# Patient Record
Sex: MALE | Race: WHITE
[De-identification: names, ages, dates, MRNs, and addresses within clinical notes are randomized per-mention and may not be internally consistent; named-entity substitution may affect disease eponyms.]

---

## 2019-10-21 ENCOUNTER — HOSPITAL ENCOUNTER (INPATIENT)
Dept: HOSPITAL 15 - ER | Age: 79
Discharge: HOME | DRG: 682 | End: 2019-10-21
Attending: INTERNAL MEDICINE | Admitting: INTERNAL MEDICINE
Payer: COMMERCIAL

## 2019-10-21 VITALS — DIASTOLIC BLOOD PRESSURE: 95 MMHG | SYSTOLIC BLOOD PRESSURE: 158 MMHG

## 2019-10-21 VITALS — HEIGHT: 73 IN | WEIGHT: 220 LBS | BODY MASS INDEX: 29.16 KG/M2

## 2019-10-21 VITALS — SYSTOLIC BLOOD PRESSURE: 158 MMHG | DIASTOLIC BLOOD PRESSURE: 95 MMHG

## 2019-10-21 DIAGNOSIS — I21.A1: ICD-10-CM

## 2019-10-21 DIAGNOSIS — D63.1: ICD-10-CM

## 2019-10-21 DIAGNOSIS — J43.9: ICD-10-CM

## 2019-10-21 DIAGNOSIS — H55.00: ICD-10-CM

## 2019-10-21 DIAGNOSIS — Z79.899: ICD-10-CM

## 2019-10-21 DIAGNOSIS — R91.1: ICD-10-CM

## 2019-10-21 DIAGNOSIS — F17.210: ICD-10-CM

## 2019-10-21 DIAGNOSIS — N20.0: ICD-10-CM

## 2019-10-21 DIAGNOSIS — N18.3: Primary | ICD-10-CM

## 2019-10-21 LAB
ALBUMIN SERPL-MCNC: 2.9 G/DL (ref 3.4–5)
ALP SERPL-CCNC: 68 U/L (ref 45–117)
ALT SERPL-CCNC: 15 U/L (ref 16–61)
ANION GAP SERPL CALCULATED.3IONS-SCNC: 7 MMOL/L (ref 5–15)
BILIRUB SERPL-MCNC: 0.5 MG/DL (ref 0.2–1)
BUN SERPL-MCNC: 25 MG/DL (ref 7–18)
BUN/CREAT SERPL: 17.2
CALCIUM SERPL-MCNC: 8.5 MG/DL (ref 8.5–10.1)
CHLORIDE SERPL-SCNC: 106 MMOL/L (ref 98–107)
CO2 SERPL-SCNC: 25 MMOL/L (ref 21–32)
GLUCOSE SERPL-MCNC: 137 MG/DL (ref 74–106)
HCT VFR BLD AUTO: 37.2 % (ref 41–53)
HGB BLD-MCNC: 12 G/DL (ref 13.5–17.5)
MAGNESIUM SERPL-MCNC: 2.2 MG/DL (ref 1.6–2.6)
MCH RBC QN AUTO: 28.5 PG (ref 28–32)
MCV RBC AUTO: 88.2 FL (ref 80–100)
NRBC BLD QL AUTO: 0 %
POTASSIUM SERPL-SCNC: 4.4 MMOL/L (ref 3.5–5.1)
PROT SERPL-MCNC: 7.2 G/DL (ref 6.4–8.2)
SODIUM SERPL-SCNC: 138 MMOL/L (ref 136–145)

## 2019-10-21 PROCEDURE — 36415 COLL VENOUS BLD VENIPUNCTURE: CPT

## 2019-10-21 PROCEDURE — 93005 ELECTROCARDIOGRAM TRACING: CPT

## 2019-10-21 PROCEDURE — 85025 COMPLETE CBC W/AUTO DIFF WBC: CPT

## 2019-10-21 PROCEDURE — 76775 US EXAM ABDO BACK WALL LIM: CPT

## 2019-10-21 PROCEDURE — 80053 COMPREHEN METABOLIC PANEL: CPT

## 2019-10-21 PROCEDURE — 70450 CT HEAD/BRAIN W/O DYE: CPT

## 2019-10-21 PROCEDURE — 84484 ASSAY OF TROPONIN QUANT: CPT

## 2019-10-21 PROCEDURE — 93306 TTE W/DOPPLER COMPLETE: CPT

## 2019-10-21 PROCEDURE — 96360 HYDRATION IV INFUSION INIT: CPT

## 2019-10-21 PROCEDURE — 83735 ASSAY OF MAGNESIUM: CPT

## 2019-10-21 PROCEDURE — 94761 N-INVAS EAR/PLS OXIMETRY MLT: CPT

## 2019-10-21 NOTE — NUR
Discharge instructions given as ordered. Encourage to follow up with PMD as instructed. All 
questions and concerns addressed. Patient verbalized understanding.  Medication 
reconciliation form completed and copy given to patient. Home medications held in Pharmacy 
returned to patient, and needed vaccines given. IV removed with catheter intact, pressure 
dressing applied. Telemetry unit returned to ICU by day shift RN. Patient taken to vehicle 
via wheelchair with all personal belongings, accompanied by staff and family member. No 
distress noted at time of departure.

## 2019-10-21 NOTE — NUR
CAME ON WC FROM ER, ALERT AND ORIENTED X4, NOT IN DISTRESS, CLEAR LS IN BILATERAL LUNG 
LOBES, SR R=96 ON TELE MONITOR, DENIED CH PAIN DIZZINESS OR DISCOMFORT, ABDOMEN SOFT WITH 
ACTIVE BS, LAST BM=10/20/19 AS REPORTED, SKIN INTACT WARM TO TOUCH, RESTING ON BED, VS 
T=97.7 RR=18 SAT=97% P=77 YJ=217/84, HEAD OF BED ELEVATED, BED ON LOWER POSITION, RAILS UP 
X2, CALL LIGHT ON REACH, PENDING CARDIAC CONSULT AND ECHO RESULT, WILL CONTINUE MONITORING.

## 2020-01-15 ENCOUNTER — HOSPITAL ENCOUNTER (EMERGENCY)
Dept: HOSPITAL 15 - ER | Age: 80
Discharge: HOME | End: 2020-01-15
Payer: COMMERCIAL

## 2020-01-15 VITALS — BODY MASS INDEX: 29.16 KG/M2 | WEIGHT: 220 LBS | HEIGHT: 73 IN

## 2020-01-15 VITALS — SYSTOLIC BLOOD PRESSURE: 141 MMHG | DIASTOLIC BLOOD PRESSURE: 66 MMHG

## 2020-01-15 DIAGNOSIS — N39.0: Primary | ICD-10-CM

## 2020-01-15 DIAGNOSIS — N18.3: ICD-10-CM

## 2020-01-15 DIAGNOSIS — I12.9: ICD-10-CM

## 2020-01-15 LAB
ANION GAP SERPL CALCULATED.3IONS-SCNC: 5 MMOL/L (ref 5–15)
BUN SERPL-MCNC: 28 MG/DL (ref 7–18)
BUN/CREAT SERPL: 15.7
CALCIUM SERPL-MCNC: 8.9 MG/DL (ref 8.5–10.1)
CHLORIDE SERPL-SCNC: 109 MMOL/L (ref 98–107)
CO2 SERPL-SCNC: 26 MMOL/L (ref 21–32)
GLUCOSE SERPL-MCNC: 97 MG/DL (ref 74–106)
HCT VFR BLD AUTO: 39.2 % (ref 41–53)
HGB BLD-MCNC: 13 G/DL (ref 13.5–17.5)
MAGNESIUM SERPL-MCNC: 2.3 MG/DL (ref 1.6–2.6)
MCH RBC QN AUTO: 27.6 PG (ref 28–32)
MCV RBC AUTO: 83.5 FL (ref 80–100)
NRBC BLD QL AUTO: 0.2 %
POTASSIUM SERPL-SCNC: 4.7 MMOL/L (ref 3.5–5.1)
SODIUM SERPL-SCNC: 140 MMOL/L (ref 136–145)
WBC CLUMPS UR QL AUTO: PRESENT /HPF

## 2020-01-15 PROCEDURE — 85025 COMPLETE CBC W/AUTO DIFF WBC: CPT

## 2020-01-15 PROCEDURE — 83735 ASSAY OF MAGNESIUM: CPT

## 2020-01-15 PROCEDURE — 81001 URINALYSIS AUTO W/SCOPE: CPT

## 2020-01-15 PROCEDURE — 36415 COLL VENOUS BLD VENIPUNCTURE: CPT

## 2020-01-15 PROCEDURE — 99284 EMERGENCY DEPT VISIT MOD MDM: CPT

## 2020-01-15 PROCEDURE — 80048 BASIC METABOLIC PNL TOTAL CA: CPT

## 2020-01-15 PROCEDURE — 51702 INSERT TEMP BLADDER CATH: CPT

## 2020-01-15 PROCEDURE — 96372 THER/PROPH/DIAG INJ SC/IM: CPT

## 2020-03-03 ENCOUNTER — HOSPITAL ENCOUNTER (EMERGENCY)
Dept: HOSPITAL 15 - ER | Age: 80
LOS: 1 days | Discharge: HOME | End: 2020-03-04
Payer: COMMERCIAL

## 2020-03-03 VITALS — HEIGHT: 73 IN | WEIGHT: 229 LBS | BODY MASS INDEX: 30.35 KG/M2

## 2020-03-03 DIAGNOSIS — F17.210: ICD-10-CM

## 2020-03-03 DIAGNOSIS — Z98.890: ICD-10-CM

## 2020-03-03 DIAGNOSIS — R33.8: Primary | ICD-10-CM

## 2020-03-03 PROCEDURE — 36415 COLL VENOUS BLD VENIPUNCTURE: CPT

## 2020-03-03 PROCEDURE — 51702 INSERT TEMP BLADDER CATH: CPT

## 2020-03-03 PROCEDURE — 80053 COMPREHEN METABOLIC PANEL: CPT

## 2020-03-03 PROCEDURE — 85025 COMPLETE CBC W/AUTO DIFF WBC: CPT

## 2020-03-04 VITALS — SYSTOLIC BLOOD PRESSURE: 120 MMHG | DIASTOLIC BLOOD PRESSURE: 79 MMHG

## 2020-03-04 LAB
ALBUMIN SERPL-MCNC: 3.1 G/DL (ref 3.4–5)
ALP SERPL-CCNC: 95 U/L (ref 45–117)
ALT SERPL-CCNC: 11 U/L (ref 16–61)
ANION GAP SERPL CALCULATED.3IONS-SCNC: 7 MMOL/L (ref 5–15)
BILIRUB SERPL-MCNC: 0.5 MG/DL (ref 0.2–1)
BUN SERPL-MCNC: 26 MG/DL (ref 7–18)
BUN/CREAT SERPL: 16.9
CALCIUM SERPL-MCNC: 8.6 MG/DL (ref 8.5–10.1)
CHLORIDE SERPL-SCNC: 106 MMOL/L (ref 98–107)
CO2 SERPL-SCNC: 24 MMOL/L (ref 21–32)
GLUCOSE SERPL-MCNC: 117 MG/DL (ref 74–106)
HCT VFR BLD AUTO: 35.4 % (ref 41–53)
HGB BLD-MCNC: 12.1 G/DL (ref 13.5–17.5)
MCH RBC QN AUTO: 27.9 PG (ref 28–32)
MCV RBC AUTO: 82.1 FL (ref 80–100)
NRBC BLD QL AUTO: 0 %
POTASSIUM SERPL-SCNC: 4.4 MMOL/L (ref 3.5–5.1)
PROT SERPL-MCNC: 7.7 G/DL (ref 6.4–8.2)
SODIUM SERPL-SCNC: 137 MMOL/L (ref 136–145)

## 2020-08-17 ENCOUNTER — HOSPITAL ENCOUNTER (INPATIENT)
Dept: HOSPITAL 15 - ER | Age: 80
LOS: 1 days | Discharge: LEFT BEFORE DISCHARGE | DRG: 682 | End: 2020-08-18
Attending: HOSPITALIST | Admitting: INTERNAL MEDICINE
Payer: COMMERCIAL

## 2020-08-17 VITALS — HEIGHT: 73 IN | WEIGHT: 210 LBS | BODY MASS INDEX: 27.83 KG/M2

## 2020-08-17 DIAGNOSIS — Z82.49: ICD-10-CM

## 2020-08-17 DIAGNOSIS — N17.9: ICD-10-CM

## 2020-08-17 DIAGNOSIS — Z83.3: ICD-10-CM

## 2020-08-17 DIAGNOSIS — Z80.9: ICD-10-CM

## 2020-08-17 DIAGNOSIS — I49.3: ICD-10-CM

## 2020-08-17 DIAGNOSIS — G89.29: ICD-10-CM

## 2020-08-17 DIAGNOSIS — E87.1: ICD-10-CM

## 2020-08-17 DIAGNOSIS — Z88.0: ICD-10-CM

## 2020-08-17 DIAGNOSIS — J44.9: ICD-10-CM

## 2020-08-17 DIAGNOSIS — K80.20: ICD-10-CM

## 2020-08-17 DIAGNOSIS — N20.9: ICD-10-CM

## 2020-08-17 DIAGNOSIS — E43: ICD-10-CM

## 2020-08-17 DIAGNOSIS — D63.8: ICD-10-CM

## 2020-08-17 DIAGNOSIS — Z53.29: ICD-10-CM

## 2020-08-17 DIAGNOSIS — N13.6: ICD-10-CM

## 2020-08-17 DIAGNOSIS — N40.0: ICD-10-CM

## 2020-08-17 DIAGNOSIS — Z87.891: ICD-10-CM

## 2020-08-17 DIAGNOSIS — E78.5: ICD-10-CM

## 2020-08-17 DIAGNOSIS — Z85.51: ICD-10-CM

## 2020-08-17 DIAGNOSIS — N28.89: ICD-10-CM

## 2020-08-17 DIAGNOSIS — N18.3: Primary | ICD-10-CM

## 2020-08-17 DIAGNOSIS — N28.1: ICD-10-CM

## 2020-08-17 DIAGNOSIS — Z87.442: ICD-10-CM

## 2020-08-17 LAB
ALBUMIN SERPL-MCNC: 2.5 G/DL (ref 3.4–5)
ALCOHOL, URINE: < 3 MG/DL (ref 0–10)
ALP SERPL-CCNC: 101 U/L (ref 45–117)
ALT SERPL-CCNC: 17 U/L (ref 16–61)
AMPHETAMINES UR QL SCN: NEGATIVE
AMYLASE SERPL-CCNC: 59 U/L (ref 25–115)
ANION GAP SERPL CALCULATED.3IONS-SCNC: 5 MMOL/L (ref 5–15)
BARBITURATES UR QL SCN: NEGATIVE
BENZODIAZ UR QL SCN: NEGATIVE
BILIRUB SERPL-MCNC: 0.5 MG/DL (ref 0.2–1)
BUN SERPL-MCNC: 21 MG/DL (ref 7–18)
BUN/CREAT SERPL: 13.6
BZE UR QL SCN: NEGATIVE
CALCIUM SERPL-MCNC: 8.7 MG/DL (ref 8.5–10.1)
CANNABINOIDS UR QL SCN: NEGATIVE
CHLORIDE SERPL-SCNC: 101 MMOL/L (ref 98–107)
CHOLEST SERPL-MCNC: 152 MG/DL (ref ?–200)
CO2 SERPL-SCNC: 29 MMOL/L (ref 21–32)
GLUCOSE SERPL-MCNC: 104 MG/DL (ref 74–106)
HCT VFR BLD AUTO: 21.2 % (ref 41–53)
HDLC SERPL-MCNC: 28 MG/DL (ref 40–59)
HGB BLD-MCNC: 6.8 G/DL (ref 13.5–17.5)
IRON SERPL-MCNC: 14 UG/DL (ref 65–175)
LIPASE SERPL-CCNC: 69 U/L (ref 73–393)
MCH RBC QN AUTO: 23 PG (ref 28–32)
MCV RBC AUTO: 71.7 FL (ref 80–100)
NRBC BLD QL AUTO: 0 %
OPIATES UR QL SCN: NEGATIVE
PCP UR QL SCN: NEGATIVE
POTASSIUM SERPL-SCNC: 3.9 MMOL/L (ref 3.5–5.1)
PROT SERPL-MCNC: 7.6 G/DL (ref 6.4–8.2)
SODIUM SERPL-SCNC: 135 MMOL/L (ref 136–145)
TIBC SERPL-MCNC: 158 UG/DL (ref 250–450)
TRIGL SERPL-MCNC: 86 MG/DL (ref ?–150)

## 2020-08-17 PROCEDURE — 83550 IRON BINDING TEST: CPT

## 2020-08-17 PROCEDURE — 76775 US EXAM ABDO BACK WALL LIM: CPT

## 2020-08-17 PROCEDURE — 36415 COLL VENOUS BLD VENIPUNCTURE: CPT

## 2020-08-17 PROCEDURE — 86920 COMPATIBILITY TEST SPIN: CPT

## 2020-08-17 PROCEDURE — 80053 COMPREHEN METABOLIC PANEL: CPT

## 2020-08-17 PROCEDURE — 85025 COMPLETE CBC W/AUTO DIFF WBC: CPT

## 2020-08-17 PROCEDURE — 84100 ASSAY OF PHOSPHORUS: CPT

## 2020-08-17 PROCEDURE — 93005 ELECTROCARDIOGRAM TRACING: CPT

## 2020-08-17 PROCEDURE — 80061 LIPID PANEL: CPT

## 2020-08-17 PROCEDURE — 85610 PROTHROMBIN TIME: CPT

## 2020-08-17 PROCEDURE — 93306 TTE W/DOPPLER COMPLETE: CPT

## 2020-08-17 PROCEDURE — 78452 HT MUSCLE IMAGE SPECT MULT: CPT

## 2020-08-17 PROCEDURE — 83540 ASSAY OF IRON: CPT

## 2020-08-17 PROCEDURE — 84484 ASSAY OF TROPONIN QUANT: CPT

## 2020-08-17 PROCEDURE — 83036 HEMOGLOBIN GLYCOSYLATED A1C: CPT

## 2020-08-17 PROCEDURE — 87040 BLOOD CULTURE FOR BACTERIA: CPT

## 2020-08-17 PROCEDURE — 93017 CV STRESS TEST TRACING ONLY: CPT

## 2020-08-17 PROCEDURE — 80307 DRUG TEST PRSMV CHEM ANLYZR: CPT

## 2020-08-17 PROCEDURE — 86900 BLOOD TYPING SEROLOGIC ABO: CPT

## 2020-08-17 PROCEDURE — 86901 BLOOD TYPING SEROLOGIC RH(D): CPT

## 2020-08-17 PROCEDURE — 86850 RBC ANTIBODY SCREEN: CPT

## 2020-08-17 PROCEDURE — 81001 URINALYSIS AUTO W/SCOPE: CPT

## 2020-08-17 PROCEDURE — 83690 ASSAY OF LIPASE: CPT

## 2020-08-17 PROCEDURE — 85730 THROMBOPLASTIN TIME PARTIAL: CPT

## 2020-08-17 PROCEDURE — 74176 CT ABD & PELVIS W/O CONTRAST: CPT

## 2020-08-17 PROCEDURE — 83735 ASSAY OF MAGNESIUM: CPT

## 2020-08-17 PROCEDURE — 82150 ASSAY OF AMYLASE: CPT

## 2020-08-17 PROCEDURE — 87086 URINE CULTURE/COLONY COUNT: CPT

## 2020-08-17 RX ADMIN — SODIUM CHLORIDE SCH MLS/HR: 0.9 INJECTION, SOLUTION INTRAVENOUS at 17:06

## 2020-08-17 RX ADMIN — AZTREONAM SCH MLS/HR: 1 INJECTION, POWDER, FOR SOLUTION INTRAMUSCULAR; INTRAVENOUS at 23:10

## 2020-08-18 VITALS — SYSTOLIC BLOOD PRESSURE: 136 MMHG | DIASTOLIC BLOOD PRESSURE: 76 MMHG

## 2020-08-18 VITALS — DIASTOLIC BLOOD PRESSURE: 78 MMHG | SYSTOLIC BLOOD PRESSURE: 148 MMHG

## 2020-08-18 VITALS — SYSTOLIC BLOOD PRESSURE: 141 MMHG | DIASTOLIC BLOOD PRESSURE: 72 MMHG

## 2020-08-18 VITALS — DIASTOLIC BLOOD PRESSURE: 65 MMHG | SYSTOLIC BLOOD PRESSURE: 113 MMHG

## 2020-08-18 VITALS — DIASTOLIC BLOOD PRESSURE: 67 MMHG | SYSTOLIC BLOOD PRESSURE: 128 MMHG

## 2020-08-18 VITALS — DIASTOLIC BLOOD PRESSURE: 68 MMHG | SYSTOLIC BLOOD PRESSURE: 112 MMHG

## 2020-08-18 VITALS — SYSTOLIC BLOOD PRESSURE: 150 MMHG | DIASTOLIC BLOOD PRESSURE: 76 MMHG

## 2020-08-18 VITALS — DIASTOLIC BLOOD PRESSURE: 76 MMHG | SYSTOLIC BLOOD PRESSURE: 150 MMHG

## 2020-08-18 VITALS — DIASTOLIC BLOOD PRESSURE: 63 MMHG | SYSTOLIC BLOOD PRESSURE: 146 MMHG

## 2020-08-18 VITALS — SYSTOLIC BLOOD PRESSURE: 133 MMHG | DIASTOLIC BLOOD PRESSURE: 76 MMHG

## 2020-08-18 VITALS — SYSTOLIC BLOOD PRESSURE: 108 MMHG | DIASTOLIC BLOOD PRESSURE: 68 MMHG

## 2020-08-18 VITALS — SYSTOLIC BLOOD PRESSURE: 136 MMHG | DIASTOLIC BLOOD PRESSURE: 66 MMHG

## 2020-08-18 VITALS — DIASTOLIC BLOOD PRESSURE: 89 MMHG | SYSTOLIC BLOOD PRESSURE: 139 MMHG

## 2020-08-18 VITALS — DIASTOLIC BLOOD PRESSURE: 65 MMHG | SYSTOLIC BLOOD PRESSURE: 124 MMHG

## 2020-08-18 LAB
ALBUMIN SERPL-MCNC: 2.3 G/DL (ref 3.4–5)
ALP SERPL-CCNC: 82 U/L (ref 45–117)
ALT SERPL-CCNC: 14 U/L (ref 16–61)
ANION GAP SERPL CALCULATED.3IONS-SCNC: 5 MMOL/L (ref 5–15)
APTT PPP: 31.9 SEC (ref 23–31.2)
BILIRUB SERPL-MCNC: 0.8 MG/DL (ref 0.2–1)
BUN SERPL-MCNC: 21 MG/DL (ref 7–18)
BUN/CREAT SERPL: 14.3
CALCIUM SERPL-MCNC: 8.4 MG/DL (ref 8.5–10.1)
CHLORIDE SERPL-SCNC: 104 MMOL/L (ref 98–107)
CO2 SERPL-SCNC: 27 MMOL/L (ref 21–32)
GLUCOSE SERPL-MCNC: 95 MG/DL (ref 74–106)
HCT VFR BLD AUTO: 22.4 % (ref 41–53)
HGB BLD-MCNC: 7.2 G/DL (ref 13.5–17.5)
INR PPP: 1.23 (ref 0.9–1.15)
MAGNESIUM SERPL-MCNC: 2.3 MG/DL (ref 1.6–2.6)
MCH RBC QN AUTO: 23.7 PG (ref 28–32)
MCV RBC AUTO: 74 FL (ref 80–100)
NRBC BLD QL AUTO: 0 %
POTASSIUM SERPL-SCNC: 4 MMOL/L (ref 3.5–5.1)
PROT SERPL-MCNC: 6.9 G/DL (ref 6.4–8.2)
SODIUM SERPL-SCNC: 136 MMOL/L (ref 136–145)

## 2020-08-18 PROCEDURE — 30233N1 TRANSFUSION OF NONAUTOLOGOUS RED BLOOD CELLS INTO PERIPHERAL VEIN, PERCUTANEOUS APPROACH: ICD-10-PCS | Performed by: HOSPITALIST

## 2020-08-18 RX ADMIN — AZTREONAM SCH MLS/HR: 1 INJECTION, POWDER, FOR SOLUTION INTRAMUSCULAR; INTRAVENOUS at 05:47

## 2020-08-18 RX ADMIN — AZTREONAM SCH MLS/HR: 1 INJECTION, POWDER, FOR SOLUTION INTRAMUSCULAR; INTRAVENOUS at 14:00

## 2020-08-18 RX ADMIN — SODIUM CHLORIDE SCH MLS/HR: 0.9 INJECTION, SOLUTION INTRAVENOUS at 09:55

## 2020-08-18 NOTE — NUR
BLOOD TRANSFUSION ENDED

BLOOD TRANSFUSION COMPLETE AND DONE INFUSING. PT TOLERATED WELL. DO S/S OF SOB/DISTRESS OR 
PAIN. PT DENIES ANY TRANSFUSION RELATED S/S. VSS. WILL CONTINUE TO MONITOR

## 2020-08-18 NOTE — NUR
Closing Note

Patient awake and alert.  Patient requesting to go AMA as he is not wanting to wait for u/s 
results.  No S/S of distress/SOB.  AMA endorsed. Care endorsed to Krzysztof ALBERTS RN.

-------------------------------------------------------------------------------

Addendum: 08/18/20 at 1959 by REJI CHRISTIANSEN RN

-------------------------------------------------------------------------------

pt AMBERNA now

## 2020-08-18 NOTE — NUR
AMA Note



FITZ NGUYEN states they want to leave the hospital Against Medical Advice (AMA).  
Patient encouraged to stay for further treatment/stabilization.  MUSA CANTU MD notified 
of patient's wishes.  Tele monitor removed and returned to telemetry techs. Patient advised 
of the risks and benefits of leaving AMA.  Patient verbalized understanding.  Patient 
encouraged to return to the ER if symptoms do not improve or worsen. pt walked out with 
walker with slow steady gait. no S/S of distress/sob or pain at time of AMA.

## 2020-08-18 NOTE — NUR
Telemetry admit from PACU

FITZ NGUYEN admitted to Telemetry unit after SBAR received.  Patient oriented to 
REJI CHRISTIANSEN, primary RN, unit, room, bed, and unit policies regarding patient care and 
visiting hours. Patient now on continuous telemetry monitoring, tele box # 47

 and telemetry reading on arrival to unit is [hr 80]. Patient is alert and awake with even 
and unlabored respirations. no S/S of distress/sob or distress.   bed is in lowest locked 
position, side rails up x2 and call light is within reach. Last unit of PRBC's currently 
running. unit started at 1618 by PACU RN. Patient tolerating well, no S/S of transfusion 
reaction present at this time. Pt educate on S/S and informed to call immediately if these 
S/S become present. Pt verbalized understanding. All questions and concerns addressed. Will 
continue to monitor q1h and PRN.

## 2020-08-18 NOTE — NUR
PT WANTING TO LEAVE

PT EXPRESSING THAT HE DOESN'T WANT TO WAIT HERE ANYMORE AND HE IS READY TO GO HOME. 
ATTEMPTED TO CALL DR. CLEMENS. SPOKE WITH DR. RAGSDALE, WHO IS COVERING FOR DR. CLEMENS. UPDATE  
ON PT STATUS AND PT NOT WANTING TO WAIT FOR TEST RESULTS TO COME BACK. PER DR. CHAWLA TO LET 
PT LEAVE AMA AND HE WILL INFORM DR. CLEMENS KNOW. WILL INFORM NOC ANGIE.

## 2020-08-18 NOTE — NUR
PATIENT ARRIVED TO PACU VIA WHEELCHAIR, AMBULATED TO [facility] WITHOUT INCIDENT. BEDSIDE REPORT 
RECEIVED FROM ER EXTERN. PATIENT IS ALERT AND APPROPRIATE. PATIENT ORIENTATED TO 
SURROUNDINGS, TELEMETRY BEING MONITORED AT THE BEDSIDE. WILL CONTINUE TO MONITOR.

## 2020-08-18 NOTE — NUR
DR. CLEMENS AT THE BEDSIDE IN PACU, NEW ORDERS RECEIVED. TRANSFUSION TO BE COMPLETED AFTER 
STRESS TEST.

## 2020-08-18 NOTE — NUR
SPOKE WITH DR. CLEMENS

SPOKE WITH DR. CLEMENS OVER THE PHONE. OER DR. MEDRANO NEEDS TO HAVE KIDNEY U/ DONE BEFORE D/C AND 
TO CALL HIM WITH RESULTS PRIOR TO D/C. D/C MAY BE HELD DEPENDING ON RESULTS OF U/S. WILL 
CONTINUE TO MONITOR Q1H AND PRN.

## 2020-08-18 NOTE — NUR
PATIENT TRANSPORTED TO ROOM 220A AND AMBULATED TO THE BED FROM THE [facility] WITHOUT INCIDENT. 
BAG AND SHOES WITH PATIENT REJI RN TO THE BEDSIDE. REPORT UPDATED. BED LOW, CALL BELL 
WITHIN REACH.

## 2020-08-18 NOTE — NUR
DR. CLEMENS UPDATED WITH BLOOD TRANSFUSION STATUS, S/P ECHO,  HAS NOT SEEN 
PATIENT AT THIS TIME AND DR. CLEMENS IS AWARE AND WILL COMPLETE THIS AS SOON AS POSSIBLE, 
PATIENT TO BE DISCHARGED AS ORDERED.  PATIENT IS AWARE AND WANTS TO GO HOME TONIGHT AS WELL.

## 2020-08-18 NOTE — NUR
TWO #2 IV'S PLACED AT THIS TIME, ONE LEFT HAND AND ONE IN THE RIGHT HAND IN PREPARATION FOR 
STRESS TEST.

## 2020-08-19 NOTE — NUR
ss consult

Per ss consult home health and front wheel walker. Patient left AMA prior to being assessed. 
Patient informed me via telephone that he has a fww and he does not need home health. 
Patient refused ss consult. 

-------------------------------------------------------------------------------

Addendum: 08/20/20 at 1123 by Elyssa Alberts 

-------------------------------------------------------------------------------

Amended: Links added.

## 2020-09-08 ENCOUNTER — HOSPITAL ENCOUNTER (INPATIENT)
Dept: HOSPITAL 15 - ER | Age: 80
LOS: 1 days | Discharge: HOME HEALTH | DRG: 725 | End: 2020-09-09
Attending: INTERNAL MEDICINE | Admitting: INTERNAL MEDICINE
Payer: COMMERCIAL

## 2020-09-08 VITALS — BODY MASS INDEX: 28.78 KG/M2 | WEIGHT: 217.16 LBS | HEIGHT: 73 IN

## 2020-09-08 VITALS — DIASTOLIC BLOOD PRESSURE: 69 MMHG | SYSTOLIC BLOOD PRESSURE: 124 MMHG

## 2020-09-08 VITALS — DIASTOLIC BLOOD PRESSURE: 70 MMHG | SYSTOLIC BLOOD PRESSURE: 135 MMHG

## 2020-09-08 DIAGNOSIS — N17.0: ICD-10-CM

## 2020-09-08 DIAGNOSIS — N28.89: ICD-10-CM

## 2020-09-08 DIAGNOSIS — J44.9: ICD-10-CM

## 2020-09-08 DIAGNOSIS — Z82.49: ICD-10-CM

## 2020-09-08 DIAGNOSIS — Z87.891: ICD-10-CM

## 2020-09-08 DIAGNOSIS — Z90.89: ICD-10-CM

## 2020-09-08 DIAGNOSIS — K57.30: ICD-10-CM

## 2020-09-08 DIAGNOSIS — R33.8: ICD-10-CM

## 2020-09-08 DIAGNOSIS — Z80.9: ICD-10-CM

## 2020-09-08 DIAGNOSIS — N40.1: Primary | ICD-10-CM

## 2020-09-08 DIAGNOSIS — Z88.0: ICD-10-CM

## 2020-09-08 DIAGNOSIS — Z83.3: ICD-10-CM

## 2020-09-08 DIAGNOSIS — E87.1: ICD-10-CM

## 2020-09-08 DIAGNOSIS — Z85.51: ICD-10-CM

## 2020-09-08 DIAGNOSIS — D64.9: ICD-10-CM

## 2020-09-08 DIAGNOSIS — N18.9: ICD-10-CM

## 2020-09-08 DIAGNOSIS — N13.30: ICD-10-CM

## 2020-09-08 DIAGNOSIS — K80.20: ICD-10-CM

## 2020-09-08 LAB
ALBUMIN SERPL-MCNC: 2.7 G/DL (ref 3.4–5)
ALP SERPL-CCNC: 70 U/L (ref 45–117)
ALT SERPL-CCNC: 13 U/L (ref 16–61)
AMYLASE SERPL-CCNC: 53 U/L (ref 25–115)
ANION GAP SERPL CALCULATED.3IONS-SCNC: 4 MMOL/L (ref 5–15)
BILIRUB SERPL-MCNC: 0.5 MG/DL (ref 0.2–1)
BUN SERPL-MCNC: 21 MG/DL (ref 7–18)
BUN/CREAT SERPL: 13.3
CALCIUM SERPL-MCNC: 8.7 MG/DL (ref 8.5–10.1)
CHLORIDE SERPL-SCNC: 101 MMOL/L (ref 98–107)
CO2 SERPL-SCNC: 27 MMOL/L (ref 21–32)
GLUCOSE SERPL-MCNC: 110 MG/DL (ref 74–106)
HCT VFR BLD AUTO: 25 % (ref 41–53)
HGB BLD-MCNC: 7.8 G/DL (ref 13.5–17.5)
LIPASE SERPL-CCNC: 63 U/L (ref 73–393)
MAGNESIUM SERPL-MCNC: 2.5 MG/DL (ref 1.6–2.6)
MCH RBC QN AUTO: 23.5 PG (ref 28–32)
MCV RBC AUTO: 74.9 FL (ref 80–100)
NRBC BLD QL AUTO: 0 %
POTASSIUM SERPL-SCNC: 4.5 MMOL/L (ref 3.5–5.1)
PROT SERPL-MCNC: 7.6 G/DL (ref 6.4–8.2)
SODIUM SERPL-SCNC: 132 MMOL/L (ref 136–145)

## 2020-09-08 PROCEDURE — 83690 ASSAY OF LIPASE: CPT

## 2020-09-08 PROCEDURE — 74176 CT ABD & PELVIS W/O CONTRAST: CPT

## 2020-09-08 PROCEDURE — 85730 THROMBOPLASTIN TIME PARTIAL: CPT

## 2020-09-08 PROCEDURE — 83735 ASSAY OF MAGNESIUM: CPT

## 2020-09-08 PROCEDURE — 85025 COMPLETE CBC W/AUTO DIFF WBC: CPT

## 2020-09-08 PROCEDURE — 87081 CULTURE SCREEN ONLY: CPT

## 2020-09-08 PROCEDURE — 82150 ASSAY OF AMYLASE: CPT

## 2020-09-08 PROCEDURE — 80053 COMPREHEN METABOLIC PANEL: CPT

## 2020-09-08 PROCEDURE — 94640 AIRWAY INHALATION TREATMENT: CPT

## 2020-09-08 PROCEDURE — 93005 ELECTROCARDIOGRAM TRACING: CPT

## 2020-09-08 PROCEDURE — 85610 PROTHROMBIN TIME: CPT

## 2020-09-08 PROCEDURE — 71045 X-RAY EXAM CHEST 1 VIEW: CPT

## 2020-09-08 PROCEDURE — 36415 COLL VENOUS BLD VENIPUNCTURE: CPT

## 2020-09-08 RX ADMIN — SODIUM CHLORIDE SCH MLS/HR: 4.5 INJECTION, SOLUTION INTRAVENOUS at 14:54

## 2020-09-08 NOTE — NUR
Opening Shift Note

Assumed care of patient, awake and alert.  No S/S of distress/SOB or pain.  Patient on 2L 
NC. Instructed on POC and to call for assist PRN, will continue to monitor for changes Q1hr 
and PRN.

## 2020-09-08 NOTE — NUR
Closing note



Patient resting in bed with even and unlabored respirations, no distress noted. Fall 
precautions in place with call light within reach.

## 2020-09-08 NOTE — NUR
Patient transferred self from wheelchair to bed



Patient ambulated with a steady gait. No distress noted.

## 2020-09-08 NOTE — NUR
Telemetry admit from ER



FITZ NGUYEN admitted to Telemetry unit after SBAR received.  Patient oriented to 
Courtney McBurney primary RN, unit, room, bed, and unit policies regarding patient care and 
visiting hours. Patient now on continuous telemetry monitoring, tele box #25  and telemetry 
reading on arrival to unit is  bpm. Patient placed on bedside oxygen 2 LPM NC and 
weighed by bed scale. Patient has Faulkner catheter in place. Catheter is patent draining into 
a leg bag. Patient demonstrated proper technique for draining catheter bag. patient 
requesting to continue emptying catheter bag.  Instructed patient on POC, fall precautions 
and to call for assistance as needed. patient verbalized understanding. Fall precautions in 
place with call light within reach.

## 2020-09-09 VITALS — DIASTOLIC BLOOD PRESSURE: 68 MMHG | SYSTOLIC BLOOD PRESSURE: 123 MMHG

## 2020-09-09 VITALS — SYSTOLIC BLOOD PRESSURE: 121 MMHG | DIASTOLIC BLOOD PRESSURE: 66 MMHG

## 2020-09-09 VITALS — DIASTOLIC BLOOD PRESSURE: 70 MMHG | SYSTOLIC BLOOD PRESSURE: 120 MMHG

## 2020-09-09 LAB
ALBUMIN SERPL-MCNC: 2.4 G/DL (ref 3.4–5)
ALP SERPL-CCNC: 61 U/L (ref 45–117)
ALT SERPL-CCNC: 10 U/L (ref 16–61)
ANION GAP SERPL CALCULATED.3IONS-SCNC: 4 MMOL/L (ref 5–15)
APTT PPP: 29.9 SEC (ref 23–31.2)
BILIRUB SERPL-MCNC: 0.4 MG/DL (ref 0.2–1)
BUN SERPL-MCNC: 27 MG/DL (ref 7–18)
BUN/CREAT SERPL: 18.4
CALCIUM SERPL-MCNC: 8.6 MG/DL (ref 8.5–10.1)
CHLORIDE SERPL-SCNC: 102 MMOL/L (ref 98–107)
CO2 SERPL-SCNC: 26 MMOL/L (ref 21–32)
GLUCOSE SERPL-MCNC: 113 MG/DL (ref 74–106)
HCT VFR BLD AUTO: 22.5 % (ref 41–53)
HGB BLD-MCNC: 7.2 G/DL (ref 13.5–17.5)
INR PPP: 1.23 (ref 0.9–1.15)
MAGNESIUM SERPL-MCNC: 2.9 MG/DL (ref 1.6–2.6)
MCH RBC QN AUTO: 23.7 PG (ref 28–32)
MCV RBC AUTO: 73.7 FL (ref 80–100)
NRBC BLD QL AUTO: 0 %
POTASSIUM SERPL-SCNC: 4.8 MMOL/L (ref 3.5–5.1)
PROT SERPL-MCNC: 7 G/DL (ref 6.4–8.2)
SODIUM SERPL-SCNC: 132 MMOL/L (ref 136–145)

## 2020-09-09 RX ADMIN — SODIUM CHLORIDE SCH MLS/HR: 4.5 INJECTION, SOLUTION INTRAVENOUS at 04:12

## 2020-09-09 NOTE — NUR
Patient was discharge prior to assess. 



Regarding social service consult for home health safety evaluation. Faxed clinical 
information to [facility] medical [facility]. Per Risa with [facility] patient has 
been accepted and service to start within 24-48hrs upon d/c day.

## 2020-09-09 NOTE — NUR
Opening Shift Note



Assumed care of patient, A/Ox4. Patient sitting in high-jay's position upon entering the 
room. Patient's IV dressing was saturated with blood and a new dressing was applied as well 
as hooked up and restarted at 70 ml/hr. Patient is able to ambulate to the restroom to empty 
leg bag independently and does so when needed. No S/S of distress/SOB or pain.  Instructed 
on POC for the day and to call for assist PRN, will continue to monitor for changes Q1hr and 
PRN.

## 2020-09-09 NOTE — NUR
Patient discharged off floor



Patient was given verbal and written education on diagnosis, prevention, diet and follow up 
appointment information. Patient verbalized understanding. IV was removed and a bandage was 
applied with coban. No signs of trauma or bleeding at the IV site. Patient was wheeled off 
the unit accompanied by staff and taken to the front of the hospital. Patient was received 
by his girlfriend. No signs of respiratory distress or pain was noted at discharge.

## 2020-09-09 NOTE — NUR
Dr. Crawford at bedside



Dr Crawford at bedside assessing patient's feelings of SOB and oxygen saturations. Patient 
instructed to walk the unit then reassess saturations. Patient ambulated around the nurses 
station returning to his bed with saturations measuring at 97%. Patient was instructed by 
the physician to maintain follow up appointments and was told he would be discharged today. 
Patient verbalized understanding. Will continue to monitor the patient and carry out 
discharge orders.

## 2020-10-30 ENCOUNTER — HOSPITAL ENCOUNTER (EMERGENCY)
Dept: HOSPITAL 15 - ER | Age: 80
Discharge: HOME | End: 2020-10-30
Payer: COMMERCIAL

## 2020-10-30 VITALS — SYSTOLIC BLOOD PRESSURE: 129 MMHG | DIASTOLIC BLOOD PRESSURE: 69 MMHG

## 2020-10-30 VITALS — HEIGHT: 73 IN | WEIGHT: 190 LBS | BODY MASS INDEX: 25.18 KG/M2

## 2020-10-30 DIAGNOSIS — N40.0: ICD-10-CM

## 2020-10-30 DIAGNOSIS — Z87.891: ICD-10-CM

## 2020-10-30 DIAGNOSIS — J44.9: ICD-10-CM

## 2020-10-30 DIAGNOSIS — Z88.8: ICD-10-CM

## 2020-10-30 DIAGNOSIS — I10: ICD-10-CM

## 2020-10-30 DIAGNOSIS — E78.5: ICD-10-CM

## 2020-10-30 DIAGNOSIS — Z90.89: ICD-10-CM

## 2020-10-30 DIAGNOSIS — Z98.890: ICD-10-CM

## 2020-10-30 DIAGNOSIS — Z88.0: ICD-10-CM

## 2020-10-30 DIAGNOSIS — Z46.6: ICD-10-CM

## 2020-10-30 DIAGNOSIS — Z79.899: ICD-10-CM

## 2020-10-30 DIAGNOSIS — N39.0: Primary | ICD-10-CM

## 2020-10-30 PROCEDURE — 81001 URINALYSIS AUTO W/SCOPE: CPT

## 2020-10-30 PROCEDURE — 51702 INSERT TEMP BLADDER CATH: CPT

## 2020-11-18 ENCOUNTER — HOSPITAL ENCOUNTER (EMERGENCY)
Dept: HOSPITAL 15 - ER | Age: 80
Discharge: HOME | End: 2020-11-18
Payer: COMMERCIAL

## 2020-11-18 VITALS — SYSTOLIC BLOOD PRESSURE: 117 MMHG | DIASTOLIC BLOOD PRESSURE: 75 MMHG

## 2020-11-18 VITALS — HEIGHT: 73 IN | BODY MASS INDEX: 25.58 KG/M2 | WEIGHT: 193 LBS

## 2020-11-18 VITALS — DIASTOLIC BLOOD PRESSURE: 60 MMHG | SYSTOLIC BLOOD PRESSURE: 113 MMHG

## 2020-11-18 VITALS — WEIGHT: 193 LBS | BODY MASS INDEX: 25.58 KG/M2 | HEIGHT: 73 IN

## 2020-11-18 DIAGNOSIS — E78.00: ICD-10-CM

## 2020-11-18 DIAGNOSIS — I10: ICD-10-CM

## 2020-11-18 DIAGNOSIS — Z88.0: ICD-10-CM

## 2020-11-18 DIAGNOSIS — E78.5: ICD-10-CM

## 2020-11-18 DIAGNOSIS — D64.9: ICD-10-CM

## 2020-11-18 DIAGNOSIS — R33.9: Primary | ICD-10-CM

## 2020-11-18 DIAGNOSIS — Z87.891: ICD-10-CM

## 2020-11-18 DIAGNOSIS — Z90.89: ICD-10-CM

## 2020-11-18 DIAGNOSIS — Z46.6: Primary | ICD-10-CM

## 2020-11-18 DIAGNOSIS — Z88.8: ICD-10-CM

## 2020-11-18 DIAGNOSIS — J44.9: ICD-10-CM

## 2020-11-18 DIAGNOSIS — Z85.528: ICD-10-CM

## 2020-11-18 DIAGNOSIS — Z79.899: ICD-10-CM

## 2020-11-18 LAB
ALBUMIN SERPL-MCNC: 2.9 G/DL (ref 3.4–5)
ALP SERPL-CCNC: 61 U/L (ref 45–117)
ALT SERPL-CCNC: 8 U/L (ref 16–61)
ANION GAP SERPL CALCULATED.3IONS-SCNC: 8 MMOL/L (ref 5–15)
BILIRUB SERPL-MCNC: 0.5 MG/DL (ref 0.2–1)
BUN SERPL-MCNC: 29 MG/DL (ref 7–18)
BUN/CREAT SERPL: 17.7
CALCIUM SERPL-MCNC: 9 MG/DL (ref 8.5–10.1)
CHLORIDE SERPL-SCNC: 103 MMOL/L (ref 98–107)
CO2 SERPL-SCNC: 25 MMOL/L (ref 21–32)
GLUCOSE SERPL-MCNC: 106 MG/DL (ref 74–106)
HCT VFR BLD AUTO: 24.5 % (ref 41–53)
HGB BLD-MCNC: 7.6 G/DL (ref 13.5–17.5)
MCH RBC QN AUTO: 22.4 PG (ref 28–32)
MCV RBC AUTO: 72.4 FL (ref 80–100)
NRBC BLD QL AUTO: 0 %
POTASSIUM SERPL-SCNC: 3.7 MMOL/L (ref 3.5–5.1)
PROT SERPL-MCNC: 7.3 G/DL (ref 6.4–8.2)
SODIUM SERPL-SCNC: 136 MMOL/L (ref 136–145)

## 2020-11-18 PROCEDURE — 36415 COLL VENOUS BLD VENIPUNCTURE: CPT

## 2020-11-18 PROCEDURE — 85025 COMPLETE CBC W/AUTO DIFF WBC: CPT

## 2020-11-18 PROCEDURE — 51702 INSERT TEMP BLADDER CATH: CPT

## 2020-11-18 PROCEDURE — 81001 URINALYSIS AUTO W/SCOPE: CPT

## 2020-11-18 PROCEDURE — 80053 COMPREHEN METABOLIC PANEL: CPT

## 2021-01-01 ENCOUNTER — HOSPITAL ENCOUNTER (EMERGENCY)
Dept: HOSPITAL 15 - ER | Age: 81
Discharge: LEFT BEFORE DISCHARGE | End: 2021-01-01
Payer: COMMERCIAL

## 2021-01-01 VITALS — DIASTOLIC BLOOD PRESSURE: 75 MMHG | SYSTOLIC BLOOD PRESSURE: 144 MMHG

## 2021-01-01 VITALS — BODY MASS INDEX: 22.53 KG/M2 | WEIGHT: 170 LBS | HEIGHT: 73 IN

## 2021-01-01 DIAGNOSIS — R39.89: Primary | ICD-10-CM

## 2021-01-01 DIAGNOSIS — Z53.21: ICD-10-CM

## 2021-01-03 ENCOUNTER — HOSPITAL ENCOUNTER (EMERGENCY)
Dept: HOSPITAL 15 - ER | Age: 81
Discharge: HOME | End: 2021-01-03
Payer: COMMERCIAL

## 2021-01-03 VITALS — BODY MASS INDEX: 22.66 KG/M2 | WEIGHT: 171 LBS | HEIGHT: 73 IN

## 2021-01-03 VITALS — SYSTOLIC BLOOD PRESSURE: 103 MMHG | DIASTOLIC BLOOD PRESSURE: 71 MMHG

## 2021-01-03 DIAGNOSIS — Z87.891: ICD-10-CM

## 2021-01-03 DIAGNOSIS — N39.0: Primary | ICD-10-CM

## 2021-01-03 DIAGNOSIS — Z46.6: ICD-10-CM

## 2021-01-03 DIAGNOSIS — I10: ICD-10-CM

## 2021-01-03 DIAGNOSIS — E78.5: ICD-10-CM

## 2021-01-03 DIAGNOSIS — J44.9: ICD-10-CM

## 2021-01-03 LAB
WBC CLUMPS UR QL AUTO: PRESENT /HPF
YEAST # UR AUTO: (no result) /HPF

## 2021-01-03 PROCEDURE — 51702 INSERT TEMP BLADDER CATH: CPT

## 2021-01-03 PROCEDURE — 81001 URINALYSIS AUTO W/SCOPE: CPT

## 2021-01-03 PROCEDURE — 87086 URINE CULTURE/COLONY COUNT: CPT

## 2021-04-07 ENCOUNTER — HOSPITAL ENCOUNTER (EMERGENCY)
Dept: HOSPITAL 15 - ER | Age: 81
Discharge: HOME | End: 2021-04-07
Payer: COMMERCIAL

## 2021-04-07 VITALS — HEIGHT: 73 IN | WEIGHT: 195 LBS | BODY MASS INDEX: 25.84 KG/M2

## 2021-04-07 VITALS — DIASTOLIC BLOOD PRESSURE: 60 MMHG | SYSTOLIC BLOOD PRESSURE: 90 MMHG

## 2021-04-07 DIAGNOSIS — Z53.21: ICD-10-CM

## 2021-04-07 DIAGNOSIS — R31.9: Primary | ICD-10-CM

## 2021-04-13 ENCOUNTER — HOSPITAL ENCOUNTER (INPATIENT)
Dept: HOSPITAL 15 - ER | Age: 81
LOS: 1 days | Discharge: HOME | DRG: 176 | End: 2021-04-14
Attending: HOSPITALIST | Admitting: NURSE PRACTITIONER
Payer: COMMERCIAL

## 2021-04-13 VITALS — WEIGHT: 195.99 LBS | HEIGHT: 73 IN | BODY MASS INDEX: 25.98 KG/M2

## 2021-04-13 DIAGNOSIS — Z82.49: ICD-10-CM

## 2021-04-13 DIAGNOSIS — Z83.3: ICD-10-CM

## 2021-04-13 DIAGNOSIS — I10: ICD-10-CM

## 2021-04-13 DIAGNOSIS — E44.0: ICD-10-CM

## 2021-04-13 DIAGNOSIS — I26.99: Primary | ICD-10-CM

## 2021-04-13 DIAGNOSIS — Z88.8: ICD-10-CM

## 2021-04-13 DIAGNOSIS — Z85.51: ICD-10-CM

## 2021-04-13 DIAGNOSIS — Z79.01: ICD-10-CM

## 2021-04-13 DIAGNOSIS — Z90.5: ICD-10-CM

## 2021-04-13 DIAGNOSIS — Z20.822: ICD-10-CM

## 2021-04-13 DIAGNOSIS — Z85.528: ICD-10-CM

## 2021-04-13 DIAGNOSIS — Z88.0: ICD-10-CM

## 2021-04-13 DIAGNOSIS — Z79.899: ICD-10-CM

## 2021-04-13 DIAGNOSIS — Z80.8: ICD-10-CM

## 2021-04-13 LAB
ALBUMIN SERPL-MCNC: 3.2 G/DL (ref 3.4–5)
ALP SERPL-CCNC: 93 U/L (ref 45–117)
ALT SERPL-CCNC: 14 U/L (ref 16–61)
ANION GAP SERPL CALCULATED.3IONS-SCNC: 7 MMOL/L (ref 5–15)
APTT PPP: 29.3 SEC (ref 23–31.2)
BILIRUB SERPL-MCNC: 0.3 MG/DL (ref 0.2–1)
BUN SERPL-MCNC: 29 MG/DL (ref 7–18)
BUN/CREAT SERPL: 17.3
CALCIUM SERPL-MCNC: 8.5 MG/DL (ref 8.5–10.1)
CHLORIDE SERPL-SCNC: 106 MMOL/L (ref 98–107)
CO2 SERPL-SCNC: 26 MMOL/L (ref 21–32)
GLUCOSE SERPL-MCNC: 89 MG/DL (ref 74–106)
HCT VFR BLD AUTO: 36.2 % (ref 41–53)
HGB BLD-MCNC: 12.1 G/DL (ref 13.5–17.5)
INR PPP: 1.03 (ref 0.9–1.15)
MCH RBC QN AUTO: 29.2 PG (ref 28–32)
MCV RBC AUTO: 87 FL (ref 80–100)
NRBC BLD QL AUTO: 0.1 %
POTASSIUM SERPL-SCNC: 4.3 MMOL/L (ref 3.5–5.1)
PROT SERPL-MCNC: 7.4 G/DL (ref 6.4–8.2)
SODIUM SERPL-SCNC: 139 MMOL/L (ref 136–145)

## 2021-04-13 PROCEDURE — 80053 COMPREHEN METABOLIC PANEL: CPT

## 2021-04-13 PROCEDURE — 93306 TTE W/DOPPLER COMPLETE: CPT

## 2021-04-13 PROCEDURE — 85610 PROTHROMBIN TIME: CPT

## 2021-04-13 PROCEDURE — 36415 COLL VENOUS BLD VENIPUNCTURE: CPT

## 2021-04-13 PROCEDURE — 85730 THROMBOPLASTIN TIME PARTIAL: CPT

## 2021-04-13 PROCEDURE — 85025 COMPLETE CBC W/AUTO DIFF WBC: CPT

## 2021-04-13 PROCEDURE — 93005 ELECTROCARDIOGRAM TRACING: CPT

## 2021-04-13 PROCEDURE — 87426 SARSCOV CORONAVIRUS AG IA: CPT

## 2021-04-13 PROCEDURE — 71275 CT ANGIOGRAPHY CHEST: CPT

## 2021-04-13 PROCEDURE — 93970 EXTREMITY STUDY: CPT

## 2021-04-13 PROCEDURE — 87081 CULTURE SCREEN ONLY: CPT

## 2021-04-13 PROCEDURE — 85379 FIBRIN DEGRADATION QUANT: CPT

## 2021-04-13 PROCEDURE — 84484 ASSAY OF TROPONIN QUANT: CPT

## 2021-04-14 VITALS — SYSTOLIC BLOOD PRESSURE: 124 MMHG | DIASTOLIC BLOOD PRESSURE: 67 MMHG

## 2021-04-14 VITALS — DIASTOLIC BLOOD PRESSURE: 67 MMHG | SYSTOLIC BLOOD PRESSURE: 121 MMHG

## 2021-04-14 VITALS — SYSTOLIC BLOOD PRESSURE: 120 MMHG | DIASTOLIC BLOOD PRESSURE: 64 MMHG

## 2021-04-14 RX ADMIN — ENOXAPARIN SODIUM SCH MG: 100 INJECTION SUBCUTANEOUS at 00:55

## 2021-04-14 RX ADMIN — ENOXAPARIN SODIUM SCH MG: 100 INJECTION SUBCUTANEOUS at 09:51

## 2021-04-19 ENCOUNTER — HOSPITAL ENCOUNTER (EMERGENCY)
Dept: HOSPITAL 15 - ER | Age: 81
LOS: 1 days | Discharge: HOME | End: 2021-04-20
Payer: COMMERCIAL

## 2021-04-19 VITALS — BODY MASS INDEX: 25.18 KG/M2 | HEIGHT: 73 IN | WEIGHT: 190 LBS

## 2021-04-19 DIAGNOSIS — Z88.0: ICD-10-CM

## 2021-04-19 DIAGNOSIS — Z86.718: ICD-10-CM

## 2021-04-19 DIAGNOSIS — Z91.018: ICD-10-CM

## 2021-04-19 DIAGNOSIS — Z86.711: ICD-10-CM

## 2021-04-19 DIAGNOSIS — Z85.528: ICD-10-CM

## 2021-04-19 DIAGNOSIS — Z88.8: ICD-10-CM

## 2021-04-19 DIAGNOSIS — I10: ICD-10-CM

## 2021-04-19 DIAGNOSIS — R31.0: ICD-10-CM

## 2021-04-19 DIAGNOSIS — N39.0: Primary | ICD-10-CM

## 2021-04-19 LAB
ALBUMIN SERPL-MCNC: 3 G/DL (ref 3.4–5)
ALP SERPL-CCNC: 79 U/L (ref 45–117)
ALT SERPL-CCNC: 10 U/L (ref 16–61)
ANION GAP SERPL CALCULATED.3IONS-SCNC: 7 MMOL/L (ref 5–15)
BILIRUB SERPL-MCNC: 0.5 MG/DL (ref 0.2–1)
BUN SERPL-MCNC: 34 MG/DL (ref 7–18)
BUN/CREAT SERPL: 19.7
CALCIUM SERPL-MCNC: 8.3 MG/DL (ref 8.5–10.1)
CHLORIDE SERPL-SCNC: 100 MMOL/L (ref 98–107)
CO2 SERPL-SCNC: 26 MMOL/L (ref 21–32)
GLUCOSE SERPL-MCNC: 107 MG/DL (ref 74–106)
HCT VFR BLD AUTO: 32.3 % (ref 41–53)
HGB BLD-MCNC: 10.9 G/DL (ref 13.5–17.5)
MCH RBC QN AUTO: 29.4 PG (ref 28–32)
MCV RBC AUTO: 87 FL (ref 80–100)
NRBC BLD QL AUTO: 0.1 %
POTASSIUM SERPL-SCNC: 4.7 MMOL/L (ref 3.5–5.1)
PROT SERPL-MCNC: 7.2 G/DL (ref 6.4–8.2)
SODIUM SERPL-SCNC: 133 MMOL/L (ref 136–145)
WBC CLUMPS UR QL AUTO: PRESENT /HPF

## 2021-04-19 PROCEDURE — 81001 URINALYSIS AUTO W/SCOPE: CPT

## 2021-04-19 PROCEDURE — 87086 URINE CULTURE/COLONY COUNT: CPT

## 2021-04-19 PROCEDURE — 80053 COMPREHEN METABOLIC PANEL: CPT

## 2021-04-19 PROCEDURE — 96365 THER/PROPH/DIAG IV INF INIT: CPT

## 2021-04-19 PROCEDURE — 36415 COLL VENOUS BLD VENIPUNCTURE: CPT

## 2021-04-19 PROCEDURE — 85025 COMPLETE CBC W/AUTO DIFF WBC: CPT

## 2021-04-19 PROCEDURE — 99284 EMERGENCY DEPT VISIT MOD MDM: CPT

## 2021-04-20 VITALS — SYSTOLIC BLOOD PRESSURE: 125 MMHG | DIASTOLIC BLOOD PRESSURE: 62 MMHG

## 2021-05-19 ENCOUNTER — HOSPITAL ENCOUNTER (EMERGENCY)
Dept: HOSPITAL 15 - ER | Age: 81
Discharge: HOME | End: 2021-05-19
Payer: COMMERCIAL

## 2021-05-19 VITALS — DIASTOLIC BLOOD PRESSURE: 61 MMHG | SYSTOLIC BLOOD PRESSURE: 133 MMHG

## 2021-05-19 VITALS — HEIGHT: 72 IN | BODY MASS INDEX: 25.73 KG/M2 | WEIGHT: 190 LBS

## 2021-05-19 DIAGNOSIS — J90: ICD-10-CM

## 2021-05-19 DIAGNOSIS — R60.0: ICD-10-CM

## 2021-05-19 DIAGNOSIS — Z20.822: ICD-10-CM

## 2021-05-19 DIAGNOSIS — N13.4: ICD-10-CM

## 2021-05-19 DIAGNOSIS — R91.8: ICD-10-CM

## 2021-05-19 DIAGNOSIS — N39.0: Primary | ICD-10-CM

## 2021-05-19 LAB
ALBUMIN SERPL-MCNC: 2.7 G/DL (ref 3.4–5)
ALP SERPL-CCNC: 64 U/L (ref 45–117)
ALT SERPL-CCNC: 9 U/L (ref 16–61)
ANION GAP SERPL CALCULATED.3IONS-SCNC: 8 MMOL/L (ref 5–15)
APTT PPP: 35.8 SEC (ref 23–31.2)
BILIRUB SERPL-MCNC: 0.4 MG/DL (ref 0.2–1)
BUN SERPL-MCNC: 48 MG/DL (ref 7–18)
BUN/CREAT SERPL: 17.7
CALCIUM SERPL-MCNC: 8.6 MG/DL (ref 8.5–10.1)
CHLORIDE SERPL-SCNC: 102 MMOL/L (ref 98–107)
CO2 SERPL-SCNC: 23 MMOL/L (ref 21–32)
GLUCOSE SERPL-MCNC: 113 MG/DL (ref 74–106)
HCT VFR BLD AUTO: 29.1 % (ref 41–53)
HGB BLD-MCNC: 10.1 G/DL (ref 13.5–17.5)
INR PPP: 1.19 (ref 0.9–1.15)
MAGNESIUM SERPL-MCNC: 3 MG/DL (ref 1.6–2.6)
MCH RBC QN AUTO: 29.7 PG (ref 28–32)
MCV RBC AUTO: 85.7 FL (ref 80–100)
NRBC BLD QL AUTO: 0.1 %
POTASSIUM SERPL-SCNC: 4.7 MMOL/L (ref 3.5–5.1)
PROT SERPL-MCNC: 7.2 G/DL (ref 6.4–8.2)
SODIUM SERPL-SCNC: 133 MMOL/L (ref 136–145)
YEAST # UR AUTO: (no result) /HPF

## 2021-05-19 PROCEDURE — 99285 EMERGENCY DEPT VISIT HI MDM: CPT

## 2021-05-19 PROCEDURE — 93971 EXTREMITY STUDY: CPT

## 2021-05-19 PROCEDURE — 96365 THER/PROPH/DIAG IV INF INIT: CPT

## 2021-05-19 PROCEDURE — 84484 ASSAY OF TROPONIN QUANT: CPT

## 2021-05-19 PROCEDURE — 85610 PROTHROMBIN TIME: CPT

## 2021-05-19 PROCEDURE — 81001 URINALYSIS AUTO W/SCOPE: CPT

## 2021-05-19 PROCEDURE — 83735 ASSAY OF MAGNESIUM: CPT

## 2021-05-19 PROCEDURE — 87426 SARSCOV CORONAVIRUS AG IA: CPT

## 2021-05-19 PROCEDURE — 71046 X-RAY EXAM CHEST 2 VIEWS: CPT

## 2021-05-19 PROCEDURE — 71250 CT THORAX DX C-: CPT

## 2021-05-19 PROCEDURE — 80053 COMPREHEN METABOLIC PANEL: CPT

## 2021-05-19 PROCEDURE — 85025 COMPLETE CBC W/AUTO DIFF WBC: CPT

## 2021-05-19 PROCEDURE — 93005 ELECTROCARDIOGRAM TRACING: CPT

## 2021-05-19 PROCEDURE — 87040 BLOOD CULTURE FOR BACTERIA: CPT

## 2021-05-19 PROCEDURE — 85730 THROMBOPLASTIN TIME PARTIAL: CPT

## 2021-05-19 PROCEDURE — 36415 COLL VENOUS BLD VENIPUNCTURE: CPT

## 2021-05-19 PROCEDURE — 85379 FIBRIN DEGRADATION QUANT: CPT

## 2021-05-19 PROCEDURE — 83880 ASSAY OF NATRIURETIC PEPTIDE: CPT
